# Patient Record
Sex: FEMALE | Race: ASIAN | Employment: STUDENT | URBAN - METROPOLITAN AREA
[De-identification: names, ages, dates, MRNs, and addresses within clinical notes are randomized per-mention and may not be internally consistent; named-entity substitution may affect disease eponyms.]

---
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
## 2021-09-19 ENCOUNTER — HEALTH MAINTENANCE LETTER (OUTPATIENT)
Age: 21
End: 2021-09-19

## 2022-01-09 ENCOUNTER — HEALTH MAINTENANCE LETTER (OUTPATIENT)
Age: 22
End: 2022-01-09

## 2022-11-21 ENCOUNTER — HEALTH MAINTENANCE LETTER (OUTPATIENT)
Age: 22
End: 2022-11-21

## 2023-04-16 ENCOUNTER — HEALTH MAINTENANCE LETTER (OUTPATIENT)
Age: 23
End: 2023-04-16

## 2024-08-20 NOTE — PROGRESS NOTES
"Patient returns for a preoperative visit prior to undergoing top surgery for gender dysphoria.    INTERVAL HISTORY: Surgery scheduled for August 20.  Patient underwent preoperative H&P already.  He is accompanied by his mother today.  Patient has family history of opioid sensitivity.    PHYSICAL EXAMINATION:  /72 (BP Location: Left arm, Patient Position: Chair, Cuff Size: Adult Regular)   Pulse 65   Ht 1.626 m (5' 4\")   Wt 53.1 kg (117 lb)   SpO2 97%   BMI 20.08 kg/m    Physical examination was deferred.    IMAGING: December 31, 2019 baseline screening mammogram was read as BI-RADS Category 1.    ASSESSMENT: Gender dysphoria, candidate for bilateral simple complete mastectomy with free nipple graft reconstruction as a form of top surgery.    PLAN: We discussed the details about surgery again.  I explained the risks include bleeding, infection, injury to surrounding structures, fluid collection, nipple or nipple graft loss, nipple sensory loss, change in nipple size, wound healing difficulties, contour deformity, dog ears, asymmetry, and need for revision surgery.  Patient accepts the associated risks and wishes to proceed.  All questions were answered.  I explained to the patient and his mother that they can either try small doses of oxycodone which does not require the metabolism once taken, or start with tramadol first.  I will let them make that decision on the day of surgery.    Total time spent with patient was 15 min of which greater than 50% was in counseling.  " Ok to refill vit d 11095   1 capsule by mouth every 14 days

## (undated) DEVICE — DECANTER BAG 2002S

## (undated) DEVICE — NDL EPIDURAL TUOHY 20GA 3.5" 405028

## (undated) DEVICE — PAD CHUX UNDERPAD 30X36" P3036C

## (undated) DEVICE — ESU ELEC BLADE 2.75" COATED/INSULATED E1455

## (undated) DEVICE — WAVEGUIDE ILLUMINATOR INVUITY EIGR WIDE/FLAT 104008

## (undated) DEVICE — SUCTION MANIFOLD NEPTUNE 2 SYS 1 PORT 702-025-000

## (undated) DEVICE — LINEN TOWEL PACK X5 5464

## (undated) DEVICE — ADH LIQUID MASTISOL TOPICAL VIAL 2-3ML 0523-48

## (undated) DEVICE — ESU GROUND PAD ADULT W/CORD E7507

## (undated) DEVICE — BNDG ELASTIC 6"X5YDS STERILE 6611-6S

## (undated) DEVICE — PACK ENT MINOR CUSTOM ASC

## (undated) DEVICE — CLOSURE SYS SKIN PREMIERPRO EXOFINFUSION 4X60CM 3473

## (undated) DEVICE — SUCTION TIP YANKAUER STR K87

## (undated) DEVICE — ESU PENCIL SMOKE EVAC W/ROCKER SWITCH 0703-047-000

## (undated) DEVICE — DRSG DRAIN 2X2" 7087

## (undated) DEVICE — PREP CHLORAPREP 26ML TINTED ORANGE  260815

## (undated) DEVICE — DRAPE TIBURON TOP SHEET 100X60" 29352

## (undated) DEVICE — GLOVE PROTEXIS POWDER FREE 7.5 ORTHOPEDIC 2D73ET75

## (undated) DEVICE — SYR 20ML LL W/O NDL 302830

## (undated) DEVICE — GLOVE GAMMEX NEOPRENE ULTRA SZ 7.5 LF 8515

## (undated) DEVICE — DRSG ABDOMINAL 07 1/2X8" 7197D

## (undated) DEVICE — HEMOSTAT ABSORBABLE AGENT ARISTA 3GM POWDER SM0002-USA

## (undated) DEVICE — SYR BULB IRRIG 50ML LATEX FREE 0035280

## (undated) DEVICE — PEN MARKING SKIN TYCO DEVON DUAL TIP 31145868

## (undated) DEVICE — DRAPE STERI INCISE 24X14" 1040

## (undated) DEVICE — SOL WATER IRRIG 500ML BOTTLE 2F7113

## (undated) DEVICE — SPONGE LAP 18X18" X8435

## (undated) DEVICE — DRAIN JACKSON PRATT CHANNEL 15FR ROUND HUBLESS SIL JP-2228

## (undated) DEVICE — SOL NACL 0.9% IRRIG 1000ML BOTTLE 2F7124

## (undated) DEVICE — SOL NACL 0.9% INJ 1000ML BAG 2B1324X

## (undated) DEVICE — BLADE KNIFE SURG 15 371115

## (undated) RX ORDER — PROPOFOL 10 MG/ML
INJECTION, EMULSION INTRAVENOUS
Status: DISPENSED
Start: 2020-08-20

## (undated) RX ORDER — LIDOCAINE HYDROCHLORIDE 20 MG/ML
INJECTION, SOLUTION EPIDURAL; INFILTRATION; INTRACAUDAL; PERINEURAL
Status: DISPENSED
Start: 2020-08-20

## (undated) RX ORDER — CEFAZOLIN SODIUM 1 G/3ML
INJECTION, POWDER, FOR SOLUTION INTRAMUSCULAR; INTRAVENOUS
Status: DISPENSED
Start: 2020-08-20

## (undated) RX ORDER — FENTANYL CITRATE 50 UG/ML
INJECTION, SOLUTION INTRAMUSCULAR; INTRAVENOUS
Status: DISPENSED
Start: 2020-08-20

## (undated) RX ORDER — ONDANSETRON 2 MG/ML
INJECTION INTRAMUSCULAR; INTRAVENOUS
Status: DISPENSED
Start: 2020-08-20

## (undated) RX ORDER — DEXAMETHASONE SODIUM PHOSPHATE 4 MG/ML
INJECTION, SOLUTION INTRA-ARTICULAR; INTRALESIONAL; INTRAMUSCULAR; INTRAVENOUS; SOFT TISSUE
Status: DISPENSED
Start: 2020-08-20

## (undated) RX ORDER — ACETAMINOPHEN 325 MG/1
TABLET ORAL
Status: DISPENSED
Start: 2020-08-20